# Patient Record
Sex: MALE | Race: WHITE | NOT HISPANIC OR LATINO
[De-identification: names, ages, dates, MRNs, and addresses within clinical notes are randomized per-mention and may not be internally consistent; named-entity substitution may affect disease eponyms.]

---

## 2021-05-12 ENCOUNTER — APPOINTMENT (OUTPATIENT)
Dept: ORTHOPEDIC SURGERY | Facility: CLINIC | Age: 26
End: 2021-05-12
Payer: COMMERCIAL

## 2021-05-12 VITALS — BODY MASS INDEX: 22.46 KG/M2 | WEIGHT: 175 LBS | RESPIRATION RATE: 16 BRPM | HEIGHT: 74 IN

## 2021-05-12 DIAGNOSIS — Z78.9 OTHER SPECIFIED HEALTH STATUS: ICD-10-CM

## 2021-05-12 PROBLEM — Z00.00 ENCOUNTER FOR PREVENTIVE HEALTH EXAMINATION: Status: ACTIVE | Noted: 2021-05-12

## 2021-05-12 PROCEDURE — 99072 ADDL SUPL MATRL&STAF TM PHE: CPT

## 2021-05-12 PROCEDURE — 99203 OFFICE O/P NEW LOW 30 MIN: CPT

## 2021-05-12 PROCEDURE — 73110 X-RAY EXAM OF WRIST: CPT | Mod: 50

## 2021-06-21 ENCOUNTER — APPOINTMENT (OUTPATIENT)
Dept: ORTHOPEDIC SURGERY | Facility: CLINIC | Age: 26
End: 2021-06-21
Payer: COMMERCIAL

## 2021-06-21 VITALS — HEIGHT: 74 IN | BODY MASS INDEX: 22.46 KG/M2 | RESPIRATION RATE: 16 BRPM | WEIGHT: 175 LBS

## 2021-06-21 PROCEDURE — 99213 OFFICE O/P EST LOW 20 MIN: CPT

## 2021-06-21 PROCEDURE — 99072 ADDL SUPL MATRL&STAF TM PHE: CPT

## 2021-06-21 PROCEDURE — 73110 X-RAY EXAM OF WRIST: CPT | Mod: LT

## 2021-06-28 ENCOUNTER — APPOINTMENT (OUTPATIENT)
Dept: ORTHOPEDIC SURGERY | Facility: CLINIC | Age: 26
End: 2021-06-28
Payer: COMMERCIAL

## 2021-06-28 PROCEDURE — 99442: CPT

## 2021-08-25 ENCOUNTER — APPOINTMENT (OUTPATIENT)
Dept: ORTHOPEDIC SURGERY | Facility: CLINIC | Age: 26
End: 2021-08-25
Payer: COMMERCIAL

## 2021-08-25 VITALS — BODY MASS INDEX: 22.46 KG/M2 | RESPIRATION RATE: 16 BRPM | HEIGHT: 74 IN | WEIGHT: 175 LBS

## 2021-08-25 DIAGNOSIS — S62.002D UNSPECIFIED FRACTURE OF NAVICULAR [SCAPHOID] BONE OF LEFT WRIST, SUBSEQUENT ENCOUNTER FOR FRACTURE WITH ROUTINE HEALING: ICD-10-CM

## 2021-08-25 PROCEDURE — 73140 X-RAY EXAM OF FINGER(S): CPT | Mod: F9

## 2021-08-25 PROCEDURE — 99214 OFFICE O/P EST MOD 30 MIN: CPT

## 2021-08-25 PROCEDURE — 73110 X-RAY EXAM OF WRIST: CPT | Mod: LT

## 2022-11-11 ENCOUNTER — APPOINTMENT (OUTPATIENT)
Dept: ORTHOPEDIC SURGERY | Facility: CLINIC | Age: 27
End: 2022-11-11

## 2022-11-11 VITALS — BODY MASS INDEX: 22.46 KG/M2 | HEIGHT: 74 IN | RESPIRATION RATE: 16 BRPM | WEIGHT: 175 LBS

## 2022-11-11 PROCEDURE — 73140 X-RAY EXAM OF FINGER(S): CPT | Mod: F1

## 2022-11-11 PROCEDURE — 99213 OFFICE O/P EST LOW 20 MIN: CPT

## 2022-11-17 ENCOUNTER — APPOINTMENT (OUTPATIENT)
Dept: ORTHOPEDIC SURGERY | Facility: CLINIC | Age: 27
End: 2022-11-17

## 2022-11-17 PROCEDURE — 99214 OFFICE O/P EST MOD 30 MIN: CPT

## 2022-11-18 RX ORDER — ACETAMINOPHEN AND CODEINE 300; 30 MG/1; MG/1
300-30 TABLET ORAL
Qty: 20 | Refills: 0 | Status: ACTIVE | COMMUNITY
Start: 2022-11-18 | End: 1900-01-01

## 2022-11-20 ENCOUNTER — NON-APPOINTMENT (OUTPATIENT)
Age: 27
End: 2022-11-20

## 2022-11-21 ENCOUNTER — APPOINTMENT (OUTPATIENT)
Age: 27
End: 2022-11-21

## 2022-11-29 ENCOUNTER — APPOINTMENT (OUTPATIENT)
Dept: ORTHOPEDIC SURGERY | Facility: AMBULATORY SURGERY CENTER | Age: 27
End: 2022-11-29

## 2022-11-29 ENCOUNTER — APPOINTMENT (OUTPATIENT)
Dept: ORTHOPEDIC SURGERY | Facility: CLINIC | Age: 27
End: 2022-11-29

## 2022-11-29 PROCEDURE — 99213 OFFICE O/P EST LOW 20 MIN: CPT

## 2022-11-29 PROCEDURE — 73140 X-RAY EXAM OF FINGER(S): CPT | Mod: F1

## 2022-11-29 NOTE — HISTORY OF PRESENT ILLNESS
[FreeTextEntry1] : Date of injury: November 8, 2022 (3 weeks)\par \par Patient is returning for follow-up regarding his left index finger ulnar collateral ligament avulsion fracture at the proximal phalanx.  He has been cale taping to the long finger since last seeing me.  He feels improvement in overall comfort and motion at the finger.  He does not feel subjective instability at the index finger.

## 2022-11-29 NOTE — ASSESSMENT
[FreeTextEntry1] : The patient does not demonstrate instability at the left thumb MCP joint today.  He also notes improvement in his symptoms and barely any discomfort.  I recommended he continue cale taping his index and long fingers for support in addition to wearing a custom MCP orthosis for protection over the next 3 weeks.  I directed he follow back up with me if he returns from his trip during the holidays (around 4 to 5 weeks).  He was well in accordance with the plan.  Patient is very happy that no surgery was performed.  All questions were answered.

## 2022-11-29 NOTE — PHYSICAL EXAM
[de-identified] : No swelling at the left index finger MCP joint.  No extensor tendon subluxation.  No tenderness at the ulnar aspect of the left index finger MCP joint.  No instability with radial and ulnar deviation stress with the MCP joint maximally flexed; this was compared to the contralateral side.  Firm endpoint noted.  Near symmetric digital range of motion. [de-identified] : PA, oblique and lateral x-rays of the left index finger were obtained today.  Displaced avulsion fragment at the ulnar aspect of the proximal phalanx base.  Concentric MCP joint.

## 2023-01-05 ENCOUNTER — APPOINTMENT (OUTPATIENT)
Dept: ORTHOPEDIC SURGERY | Facility: CLINIC | Age: 28
End: 2023-01-05
Payer: COMMERCIAL

## 2023-01-05 PROCEDURE — 99213 OFFICE O/P EST LOW 20 MIN: CPT

## 2023-01-05 PROCEDURE — 73140 X-RAY EXAM OF FINGER(S): CPT | Mod: F1

## 2023-02-02 ENCOUNTER — APPOINTMENT (OUTPATIENT)
Dept: ORTHOPEDIC SURGERY | Facility: CLINIC | Age: 28
End: 2023-02-02

## 2023-02-03 ENCOUNTER — APPOINTMENT (OUTPATIENT)
Dept: ORTHOPEDIC SURGERY | Facility: CLINIC | Age: 28
End: 2023-02-03
Payer: COMMERCIAL

## 2023-02-03 DIAGNOSIS — S62.611D DISPLACED FRACTURE OF PROXIMAL PHALANX OF LEFT INDEX FINGER, SUBSEQUENT ENCOUNTER FOR FRACTURE WITH ROUTINE HEALING: ICD-10-CM

## 2023-02-03 PROCEDURE — 99213 OFFICE O/P EST LOW 20 MIN: CPT

## 2023-05-12 ENCOUNTER — APPOINTMENT (OUTPATIENT)
Dept: ORTHOPEDIC SURGERY | Facility: CLINIC | Age: 28
End: 2023-05-12
Payer: COMMERCIAL

## 2023-05-12 ENCOUNTER — TRANSCRIPTION ENCOUNTER (OUTPATIENT)
Age: 28
End: 2023-05-12

## 2023-05-12 DIAGNOSIS — S69.90XA UNSPECIFIED INJURY OF UNSPECIFIED WRIST, HAND AND FINGER(S), INITIAL ENCOUNTER: ICD-10-CM

## 2023-05-12 PROCEDURE — 99213 OFFICE O/P EST LOW 20 MIN: CPT

## 2023-05-12 PROCEDURE — 73140 X-RAY EXAM OF FINGER(S): CPT

## 2023-06-06 ENCOUNTER — RESULT REVIEW (OUTPATIENT)
Age: 28
End: 2023-06-06

## 2023-06-06 ENCOUNTER — OUTPATIENT (OUTPATIENT)
Dept: OUTPATIENT SERVICES | Facility: HOSPITAL | Age: 28
LOS: 1 days | End: 2023-06-06
Payer: COMMERCIAL

## 2023-06-06 ENCOUNTER — APPOINTMENT (OUTPATIENT)
Dept: ORTHOPEDIC SURGERY | Facility: CLINIC | Age: 28
End: 2023-06-06
Payer: COMMERCIAL

## 2023-06-06 VITALS
OXYGEN SATURATION: 98 % | BODY MASS INDEX: 22.53 KG/M2 | HEIGHT: 73 IN | HEART RATE: 70 BPM | DIASTOLIC BLOOD PRESSURE: 81 MMHG | WEIGHT: 170 LBS | SYSTOLIC BLOOD PRESSURE: 133 MMHG

## 2023-06-06 DIAGNOSIS — S93.491A SPRAIN OF OTHER LIGAMENT OF RIGHT ANKLE, INITIAL ENCOUNTER: ICD-10-CM

## 2023-06-06 PROCEDURE — 99213 OFFICE O/P EST LOW 20 MIN: CPT

## 2023-06-06 PROCEDURE — 73610 X-RAY EXAM OF ANKLE: CPT

## 2023-06-06 PROCEDURE — 73610 X-RAY EXAM OF ANKLE: CPT | Mod: 26,50

## 2023-06-06 NOTE — HISTORY OF PRESENT ILLNESS
[de-identified] : ARACELI HARMAN is a 28 year old male who presents with right ankle pain.\par States the onset of pain was yesterday 6/5/23\par Playing basketball had inversion injury was able to weight bear with difficulty\par Could not finish the game and had to take an Uber home.\par Hx of recurrent inversion ankle injuries bilaterally\par Has worn ankle braces previously but not this time.\par \par Pain is anterolateral \par Pain is nonradiating.\par There is associated swelling\par There is no associated numbness, paraesthesia or weakness.\par Exacerbating factors are standing, walking for prolonged periods.\par Has tried rest, ice, elevation and acetaminophen.\par Patient ambulates independently.\par Exercises regularly. Basketball, surf, weights \par Has not tried PT. 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

## 2023-06-06 NOTE — PHYSICAL EXAM
[de-identified] : General: Well-nourished, well-developed, alert, and in no acute distress.\par Head: Normocephalic.\par Eyes: Pupils equal, extraocular muscles intact, normal sclera.\par Nose: No nasal discharge.\par Cardiovascular: Extremities are warm and well perfused. Distal pulses are symmetric bilaterally.\par Respiratory: No labored breathing.\par Extremities: Sensation is intact distally bilaterally. Distal pulses are symmetric bilaterally\par Lymphatic: No regional lymphadenopathy, no lymphedema\par Neurologic: No focal deficits\par Skin: Normal skin color, texture, and turgor\par Psychiatric: Normal affect \par \par Examination of right ankle\par Gait antalgic\par Able to toe walk, heel walk\par Ambulating independently\par Inspection: Mild anterolateral effusion. No hematoma or ecchymosis.\par Alignment: neutral arch\par Tender to palpation: ATFL \par Nontender to palpation: medial malleolus, lateral malleolus, base of 5th metatarsal, navicular, CFL, PTFL, AITFL, Achilles tendon, PT, FHL, tibialis anterior, peroneals, plantar fascia\par \par ROM: Plantar flexion 45 deg, dorsiflexion 20 deg, inversion 20 deg, eversion 30 deg\par Special Tests: \par Anterior Drawer (ATFL): negative for pain and laxity\par Talar Tilt (CFL): negative for pain and laxity\par Kleigers (ext rot): negative for pain and laxity at deltoid ligament or distal fibula\par Squeeze test: negative at proximal or distal syndesmosis\par Bump test: negative at talar dome, syndesmosis\par Cohen test negative\par \par Sensation is intact to light touch over the superficial and deep peroneal nerve distributions and the posterior tibial nerve distribution. Capillary refill is less than two seconds. Posterior tibial and dorsalis pedis pulses 2+ equal bilaterally. No calf swelling or tenderness bilaterally. Strength testing shows 5/5 Quads, 5/5 Hamstrings, 5/5 EHL, 5/5 FHL, 5/5 tibialis anterior, 5/5 gastroc-soleus complex. \par Reflexes: Patellar 2+, Achilles 2+.\par   [de-identified] : XR bilateral ankle (6/6/23): There is no evidence of fracture or dislocation. The ankle mortise is intact bilaterally.

## 2023-06-06 NOTE — ASSESSMENT
[FreeTextEntry1] : ARACELI HARMAN is a 28 year old male with right ankle pain.\par I discussed with the patient that their symptoms, signs, and imaging are most consistent with ATFL sprain.\par We reviewed the natural history of this condition and treatment options.\par We agreed on the following plan:\par \par Xray taken and reviewed with patient today.\par Start Home Exercises for ankle conditioning. Demonstration and handout provided. \par Physical therapy. Referral provided.\par Recommend 150 min per week of moderate intensity aerobic activity \par RICE therapy\par Advised lace up or velcro brace \par Advanced imaging: consider MRI if no symptomatic improvement. \par Follow up in 6-8 weeks.

## 2025-03-21 ENCOUNTER — APPOINTMENT (OUTPATIENT)
Dept: ORTHOPEDIC SURGERY | Facility: CLINIC | Age: 30
End: 2025-03-21
Payer: COMMERCIAL

## 2025-03-21 DIAGNOSIS — M25.532 PAIN IN LEFT WRIST: ICD-10-CM

## 2025-03-21 PROCEDURE — 99214 OFFICE O/P EST MOD 30 MIN: CPT

## 2025-03-21 PROCEDURE — 73110 X-RAY EXAM OF WRIST: CPT | Mod: RT
